# Patient Record
Sex: MALE | Race: WHITE | URBAN - NONMETROPOLITAN AREA
[De-identification: names, ages, dates, MRNs, and addresses within clinical notes are randomized per-mention and may not be internally consistent; named-entity substitution may affect disease eponyms.]

---

## 2017-02-21 RX ORDER — VENLAFAXINE 75 MG/1
TABLET ORAL
Qty: 180 TABLET | Refills: 0 | Status: SHIPPED | OUTPATIENT
Start: 2017-02-21 | End: 2017-05-25

## 2017-02-21 NOTE — TELEPHONE ENCOUNTER
Discussed a f/u OV, states he cannot afford a visit right now, between jobs. He's losing weight, and wants to come in to discuss.

## 2017-05-26 RX ORDER — VENLAFAXINE 75 MG/1
TABLET ORAL
Qty: 180 TABLET | Refills: 0 | Status: SHIPPED | OUTPATIENT
Start: 2017-05-26 | End: 2017-08-27

## 2017-08-28 RX ORDER — VENLAFAXINE 75 MG/1
TABLET ORAL
Qty: 60 TABLET | Refills: 0 | Status: SHIPPED | OUTPATIENT
Start: 2017-08-28 | End: 2017-09-14

## 2017-09-14 NOTE — PATIENT INSTRUCTIONS
Counseling x 20 min  Increase med    DIET,EXERSISE,WT LOSS.LOW CARB,HIGH PROTEIN DIET discussed,sign of GLYCEMIC INDEX. Aerobic exersise,interval training - 4-5x/wk. CORE STRENGTHENING - reviewed  goal wt.20 min spent w/ councilling

## 2017-09-14 NOTE — PROGRESS NOTES
HPI:    Patient ID: Reshma Dimas is a 24year old male. Patient with complaints of emotional swings. Stress. Poor sleep. Anxiety. Depression. Denies suicide or hurting other people. Taking medication as directed. New job. Increased stress.   We

## 2017-10-16 NOTE — PROGRESS NOTES
HPI:    Patient ID: Josh Valladares is a 24year old male. Pt doing well w/ meds  + diet / wt loss  Stress improved  HPI    Review of Systems   Psychiatric/Behavioral: Negative for dysphoric mood, self-injury, sleep disturbance and suicidal ideas.  The pa

## 2017-11-28 DIAGNOSIS — F41.8 DEPRESSION WITH ANXIETY: ICD-10-CM

## 2017-11-29 RX ORDER — VENLAFAXINE 75 MG/1
TABLET ORAL
Qty: 90 TABLET | Refills: 0 | Status: SHIPPED | OUTPATIENT
Start: 2017-11-29 | End: 2017-12-30

## 2017-12-30 DIAGNOSIS — F41.8 DEPRESSION WITH ANXIETY: ICD-10-CM

## 2018-01-02 RX ORDER — VENLAFAXINE 75 MG/1
TABLET ORAL
Qty: 90 TABLET | Refills: 0 | Status: SHIPPED | OUTPATIENT
Start: 2018-01-02 | End: 2018-03-01

## 2018-01-02 RX ORDER — VENLAFAXINE 75 MG/1
TABLET ORAL
Qty: 90 TABLET | Refills: 0 | Status: SHIPPED | OUTPATIENT
Start: 2018-01-02 | End: 2018-01-15

## 2018-01-02 NOTE — TELEPHONE ENCOUNTER
Last OV 11/16/17  Last ordered 11/29/17 #90/0rf  It is requested x2, looks like that is a mistake, our list shows she is only taking 75 mg. 3 daily. Do you want more than 30 days ordered?
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
## 2018-01-15 NOTE — PATIENT INSTRUCTIONS
Counseling ×20 minutes. Positive mental imaging. Discussed monitoring blood pressure at home. Reassurance given. Patient to call with questions or problems. DIET,EXERSISE,WT LOSS.LOW CARB,HIGH PROTEIN DIET discussed,sign of GLYCEMIC INDEX. Aerobic ex

## 2018-03-01 DIAGNOSIS — F41.8 DEPRESSION WITH ANXIETY: ICD-10-CM

## 2018-03-02 RX ORDER — VENLAFAXINE 75 MG/1
TABLET ORAL
Qty: 90 TABLET | Refills: 0 | Status: SHIPPED | OUTPATIENT
Start: 2018-03-02 | End: 2018-04-03

## 2018-04-03 DIAGNOSIS — F41.8 DEPRESSION WITH ANXIETY: ICD-10-CM

## 2018-04-04 RX ORDER — VENLAFAXINE 75 MG/1
TABLET ORAL
Qty: 90 TABLET | Refills: 2 | Status: SHIPPED | OUTPATIENT
Start: 2018-04-04 | End: 2018-07-05

## 2018-07-05 DIAGNOSIS — F41.8 DEPRESSION WITH ANXIETY: ICD-10-CM

## 2018-07-06 RX ORDER — VENLAFAXINE 75 MG/1
TABLET ORAL
Qty: 90 TABLET | Refills: 0 | Status: SHIPPED | OUTPATIENT
Start: 2018-07-06 | End: 2018-08-07

## 2018-08-07 DIAGNOSIS — F41.8 DEPRESSION WITH ANXIETY: ICD-10-CM

## 2018-08-07 RX ORDER — VENLAFAXINE 75 MG/1
TABLET ORAL
Qty: 90 TABLET | Refills: 0 | Status: SHIPPED | OUTPATIENT
Start: 2018-08-07 | End: 2018-09-04

## 2018-08-07 NOTE — TELEPHONE ENCOUNTER
Last office visit:  01/15/18  Last refill:  07/06/18   #90 with no refills      No future appointments.

## 2018-08-08 NOTE — TELEPHONE ENCOUNTER
Contacted patient and notified of need for follow up. Pt states he recently had to switch insurance to [employer] and therefore can not schedule follow up appt as we do not take his insurance.  Pt informed we can not continue to refill this medicati

## 2018-09-04 ENCOUNTER — OFFICE VISIT (OUTPATIENT)
Dept: FAMILY MEDICINE CLINIC | Facility: CLINIC | Age: 28
End: 2018-09-04

## 2018-09-04 VITALS
WEIGHT: 293 LBS | BODY MASS INDEX: 38.01 KG/M2 | RESPIRATION RATE: 12 BRPM | SYSTOLIC BLOOD PRESSURE: 120 MMHG | HEIGHT: 73.75 IN | DIASTOLIC BLOOD PRESSURE: 70 MMHG | HEART RATE: 60 BPM | TEMPERATURE: 98 F

## 2018-09-04 DIAGNOSIS — F41.8 DEPRESSION WITH ANXIETY: ICD-10-CM

## 2018-09-04 DIAGNOSIS — E66.01 CLASS 3 SEVERE OBESITY DUE TO EXCESS CALORIES WITHOUT SERIOUS COMORBIDITY WITH BODY MASS INDEX (BMI) OF 40.0 TO 44.9 IN ADULT (HCC): Primary | ICD-10-CM

## 2018-09-04 PROCEDURE — 99213 OFFICE O/P EST LOW 20 MIN: CPT | Performed by: FAMILY MEDICINE

## 2018-09-04 RX ORDER — VENLAFAXINE 75 MG/1
TABLET ORAL
Qty: 270 TABLET | Refills: 1 | Status: SHIPPED | OUTPATIENT
Start: 2018-09-04 | End: 2019-03-07

## 2018-09-04 NOTE — PROGRESS NOTES
HPI:    Patient ID: Josh Valladares is a 34year old male. Stress improved w/ work  W/o problem w/ med  + diet / wt loss    HPI    Review of Systems   Respiratory: Negative for cough.     Cardiovascular: Negative for chest pain, palpitations and leg swell

## 2019-03-07 DIAGNOSIS — F41.8 DEPRESSION WITH ANXIETY: ICD-10-CM

## 2019-03-11 RX ORDER — VENLAFAXINE 75 MG/1
TABLET ORAL
Qty: 270 TABLET | Refills: 0 | Status: SHIPPED | OUTPATIENT
Start: 2019-03-11 | End: 2019-06-13

## 2019-06-13 DIAGNOSIS — F41.8 DEPRESSION WITH ANXIETY: ICD-10-CM

## 2019-06-13 RX ORDER — VENLAFAXINE 75 MG/1
TABLET ORAL
Qty: 270 TABLET | Refills: 0 | Status: SHIPPED | OUTPATIENT
Start: 2019-06-13

## 2019-06-13 NOTE — TELEPHONE ENCOUNTER
Venlafaxine refill request.     Last office visit: 9/4/2018  Last refill: 3/11/2019, #270    No future appointments.

## 2019-09-11 DIAGNOSIS — F41.8 DEPRESSION WITH ANXIETY: ICD-10-CM

## 2019-09-11 RX ORDER — VENLAFAXINE 75 MG/1
TABLET ORAL
Qty: 270 TABLET | Refills: 0 | OUTPATIENT
Start: 2019-09-11

## 2019-09-11 NOTE — TELEPHONE ENCOUNTER
Venalfaxine refill request.     Last office visit: 9/4/2018  Last refill: 6/13/2019, #270, 0 refills    No future appointments.

## 2019-09-12 NOTE — TELEPHONE ENCOUNTER
PATIENT IS NO LONGER A PATIENT OF DR Maritza Byrd; HE DOESN'T KNOW WHY SCRIPT REQUEST SENT TO HIM. PLEASE DISREGARD.   [place] [facility] [place]